# Patient Record
Sex: MALE | Race: WHITE | Employment: UNEMPLOYED | URBAN - METROPOLITAN AREA
[De-identification: names, ages, dates, MRNs, and addresses within clinical notes are randomized per-mention and may not be internally consistent; named-entity substitution may affect disease eponyms.]

---

## 2017-02-16 PROBLEM — R97.20 ELEVATED PROSTATE SPECIFIC ANTIGEN (PSA): Status: ACTIVE | Noted: 2017-02-16

## 2018-06-21 ENCOUNTER — TELEPHONE (OUTPATIENT)
Dept: UROLOGY | Age: 73
End: 2018-06-21

## 2018-06-21 ENCOUNTER — OFFICE VISIT (OUTPATIENT)
Dept: UROLOGY | Age: 73
End: 2018-06-21
Payer: COMMERCIAL

## 2018-06-21 VITALS
DIASTOLIC BLOOD PRESSURE: 75 MMHG | HEART RATE: 59 BPM | TEMPERATURE: 97.6 F | WEIGHT: 212 LBS | HEIGHT: 68 IN | BODY MASS INDEX: 32.13 KG/M2 | SYSTOLIC BLOOD PRESSURE: 118 MMHG

## 2018-06-21 DIAGNOSIS — R97.20 ELEVATED PSA: Primary | ICD-10-CM

## 2018-06-21 PROCEDURE — 99204 OFFICE O/P NEW MOD 45 MIN: CPT | Performed by: UROLOGY

## 2018-06-21 RX ORDER — ATORVASTATIN CALCIUM 80 MG/1
TABLET, FILM COATED ORAL
COMMUNITY
Start: 2018-06-16 | End: 2019-10-18 | Stop reason: SDUPTHER

## 2018-06-21 RX ORDER — TICAGRELOR 90 MG/1
TABLET ORAL
COMMUNITY
Start: 2018-06-16 | End: 2019-05-01 | Stop reason: SDUPTHER

## 2018-06-21 ASSESSMENT — ENCOUNTER SYMPTOMS
SHORTNESS OF BREATH: 0
WHEEZING: 0
COLOR CHANGE: 0
ABDOMINAL PAIN: 0
BACK PAIN: 0
EYE PAIN: 0
EYE REDNESS: 0
VOMITING: 0
NAUSEA: 0
COUGH: 0

## 2018-06-21 NOTE — TELEPHONE ENCOUNTER
Dr Leslee Santillan office called. Patient just had a stent put in about 1 month ago and cannot go off blood thinners for prostate biopsy.   Please advise

## 2018-06-26 ENCOUNTER — TELEPHONE (OUTPATIENT)
Dept: UROLOGY | Age: 73
End: 2018-06-26

## 2018-06-26 RX ORDER — CIPROFLOXACIN 500 MG/1
500 TABLET, FILM COATED ORAL 2 TIMES DAILY
Qty: 6 TABLET | Refills: 0 | Status: CANCELLED | OUTPATIENT
Start: 2018-07-26 | End: 2018-07-29

## 2018-06-27 NOTE — TELEPHONE ENCOUNTER
Patient left message on surgery scheduler line.   He would like to delay prostate biopsy until January

## 2018-07-03 NOTE — TELEPHONE ENCOUNTER
Cancelled procedure as patient requested.  Left message for patient to call and reschedule and recall entered

## 2018-11-15 ENCOUNTER — TELEPHONE (OUTPATIENT)
Dept: UROLOGY | Age: 73
End: 2018-11-15

## 2019-04-10 ENCOUNTER — OFFICE VISIT (OUTPATIENT)
Dept: PRIMARY CARE CLINIC | Age: 74
End: 2019-04-10
Payer: COMMERCIAL

## 2019-04-10 VITALS
WEIGHT: 201.6 LBS | HEART RATE: 58 BPM | DIASTOLIC BLOOD PRESSURE: 80 MMHG | OXYGEN SATURATION: 98 % | SYSTOLIC BLOOD PRESSURE: 134 MMHG | BODY MASS INDEX: 30.65 KG/M2

## 2019-04-10 DIAGNOSIS — Z12.5 ENCOUNTER FOR SCREENING FOR MALIGNANT NEOPLASM OF PROSTATE: ICD-10-CM

## 2019-04-10 DIAGNOSIS — E11.9 TYPE 2 DIABETES MELLITUS WITHOUT COMPLICATION, WITHOUT LONG-TERM CURRENT USE OF INSULIN (HCC): Primary | ICD-10-CM

## 2019-04-10 DIAGNOSIS — I25.10 CORONARY ARTERY DISEASE INVOLVING NATIVE HEART WITHOUT ANGINA PECTORIS, UNSPECIFIED VESSEL OR LESION TYPE: ICD-10-CM

## 2019-04-10 DIAGNOSIS — R97.20 ELEVATED PROSTATE SPECIFIC ANTIGEN (PSA): ICD-10-CM

## 2019-04-10 DIAGNOSIS — R97.20 ELEVATED PSA MEASUREMENT: ICD-10-CM

## 2019-04-10 LAB — HBA1C MFR BLD: 9.7 %

## 2019-04-10 PROCEDURE — 99214 OFFICE O/P EST MOD 30 MIN: CPT | Performed by: FAMILY MEDICINE

## 2019-04-10 PROCEDURE — 83036 HEMOGLOBIN GLYCOSYLATED A1C: CPT | Performed by: FAMILY MEDICINE

## 2019-04-10 ASSESSMENT — PATIENT HEALTH QUESTIONNAIRE - PHQ9
SUM OF ALL RESPONSES TO PHQ QUESTIONS 1-9: 0
SUM OF ALL RESPONSES TO PHQ QUESTIONS 1-9: 0
1. LITTLE INTEREST OR PLEASURE IN DOING THINGS: 0
2. FEELING DOWN, DEPRESSED OR HOPELESS: 0
SUM OF ALL RESPONSES TO PHQ9 QUESTIONS 1 & 2: 0

## 2019-04-10 ASSESSMENT — ENCOUNTER SYMPTOMS
SHORTNESS OF BREATH: 0
COUGH: 0

## 2019-04-10 NOTE — PROGRESS NOTES
[address] [facility] PRIMARY CARE  [id number] [facility]  Dept: [address] [facility] Tyrone is a 68 y.o. male who presents today for his medical conditions/complaintsas noted below. Marlon Haines is c/o of   Chief Complaint   Patient presents with    Diabetes     6 month follow up. Pt states his sugars are high, usually over 200.  Other     Pt would like a lab order to check his PSA       HPI:     HPI  NOT CHECKING SUGARS VERY OFTEN, BUT IS ALWAYS OVER 200. CAN'T GET IT TO COME DOWN. Pt never had prostate biopsy done- wanted to see if level went back up again. Hemoglobin A1C (%)   Date Value   09/20/2018 7.6   06/07/2018 7.5   11/20/2017 6.9             ( goal A1C is < 7)   No results found for: LABMICR  LDL Calculated (mg/dL)   Date Value   02/06/2017 54       (goal LDL is <100)   No results found for: AST, ALT, BUN  BP Readings from Last 3 Encounters:   04/10/19 134/80   09/20/18 132/76   06/21/18 118/75          (goal 140/90)    Past Medical History:   Diagnosis Date    Hyperglycemia     Neoplasm of uncertain behavior of skin     Rectal/anal hemorrhage         Social History     Tobacco Use    Smoking status: Never Smoker    Smokeless tobacco: Never Used   Substance Use Topics    Alcohol use:  Yes     Alcohol/week: 0.0 oz     Comment: occ      Current Outpatient Medications   Medication Sig Dispense Refill    metFORMIN (GLUCOPHAGE) 1000 MG tablet TAKE 1 TABLET BY MOUTH TWO TIMES A DAY WITH MEALS 60 tablet 3    losartan (COZAAR) 50 MG tablet TAKE 1 TABLET BY MOUTH ONE TIME A DAY  30 tablet 2    glipiZIDE (GLUCOTROL) 5 MG tablet TAKE 1 TABLET BY MOUTH TWO TIMES A DAY BEFORE MEALS  60 tablet 2    valsartan (DIOVAN) 80 MG tablet Take 1 tablet by mouth daily 30 tablet 3    atorvastatin (LIPITOR) 80 MG tablet       BRILINTA 90 MG TABS tablet       simvastatin (ZOCOR) 40 MG tablet TAKE 1 TABLET BY MOUTH ONE TIME A DAY  30 tablet 2    metoprolol tartrate (LOPRESSOR) 25 MG tablet Take 1 tablet by mouth 2 times daily 60 tablet 3    nitroGLYCERIN (NITROSTAT) 0.4 MG SL tablet Place 0.4 mg under the tongue every 5 minutes as needed for Chest pain Dissolve 1 tab under tongue at first sign of chest pain. May repeat every 5 minutes until relief is obtained. If pain persists after taking 3 tabs in a 15-minute period, or the pain is different than is typically experienced, call 9-1-1 immediately.  glucose blood VI test strips (ASCENSIA AUTODISC VI;ONE TOUCH ULTRA TEST VI) strip 1 each by In Vitro route daily Indications: Type 2 Diabetes Test once daily 100 each 2    aspirin 81 MG tablet Take 81 mg by mouth daily Indications: Disease of the Arteries of the Heart       No current facility-administered medications for this visit. Allergies   Allergen Reactions    Lisinopril      Other reaction(s): Intolerance-unknown       Health Maintenance   Topic Date Due    Diabetic retinal exam  04/15/1955    Shingles Vaccine (1 of 2) 04/15/1995    Pneumococcal 65+ years Vaccine (2 of 2 - PPSV23) 11/10/2016    Diabetic foot exam  01/19/2018    Lipid screen  02/06/2018    Potassium monitoring  02/06/2018    Creatinine monitoring  02/06/2018    DTaP/Tdap/Td vaccine (2 - Td) 08/28/2018    A1C test (Diabetic or Prediabetic)  09/20/2019    Diabetic microalbuminuria test  09/20/2019    Colon cancer screen colonoscopy  03/10/2024    Flu vaccine  Completed    Hepatitis C screen  Completed       Subjective:     Review of Systems   Constitutional: Negative for chills and fever. Respiratory: Negative for cough and shortness of breath. Cardiovascular: Negative for chest pain and palpitations. Neurological: Negative for dizziness and light-headedness. Objective:     /80   Pulse 58   Wt 201 lb 9.6 oz (91.4 kg)   SpO2 98%   BMI 30.65 kg/m²   Physical Exam   Constitutional: He is oriented to person, place, and time.  He appears well-developed and well-nourished. No distress. HENT:   Head: Normocephalic and atraumatic. Eyes: Pupils are equal, round, and reactive to light. Conjunctivae are normal. Right eye exhibits no discharge. Left eye exhibits no discharge. No scleral icterus. Neck: No tracheal deviation present. No thyromegaly present. Cardiovascular: Normal rate, regular rhythm and normal heart sounds. No carotid bruits   Pulmonary/Chest: Effort normal and breath sounds normal. No respiratory distress. He has no wheezes. Musculoskeletal: He exhibits no edema. Lymphadenopathy:     He has no cervical adenopathy. Neurological: He is alert and oriented to person, place, and time. Skin: Skin is warm. No rash noted. Psychiatric: He has a normal mood and affect. His behavior is normal. Thought content normal.   Nursing note and vitals reviewed. Assessment:       Diagnosis Orders   1. Type 2 diabetes mellitus without complication, without long-term current use of insulin (HCC)  Insulin, Free    Insulin, Total    Psa screening   2. Elevated PSA measurement  Insulin, Free    Insulin, Total    Psa screening   3. Encounter for screening for malignant neoplasm of prostate   Psa screening   4. Coronary artery disease involving native heart without angina pectoris, unspecified vessel or lesion type     5. Elevated prostate specific antigen (PSA)          Plan:    d/w pt that if he has cancer would he want treatment or not. Return in about 3 months (around 7/10/2019) for DM check, HTN f/u. Orders Placed This Encounter   Procedures    Insulin, Free     Standing Status:   Future     Standing Expiration Date:   4/10/2020    Insulin, Total     Standing Status:   Future     Standing Expiration Date:   4/10/2020    Psa screening     Standing Status:   Future     Standing Expiration Date:   4/10/2020     No orders of the defined types were placed in this encounter.       Patient given educationalmaterials - see patient instructions. Discussed use, benefit, and side effectsof prescribed medications. All patient questions answered. Pt voiced understanding. Reviewed health maintenance. Instructed to continue current medications, diet andexercise. Patient agreed with treatment plan. Follow up as directed.      Electronicallysigned by Horacio Noonan MD on 4/10/2019 at 2:58 PM

## 2019-05-01 RX ORDER — TICAGRELOR 90 MG/1
90 TABLET ORAL 2 TIMES DAILY
Qty: 60 TABLET | Refills: 2 | Status: SHIPPED | OUTPATIENT
Start: 2019-05-01 | End: 2019-07-07 | Stop reason: SDUPTHER

## 2019-05-01 NOTE — TELEPHONE ENCOUNTER
Last visit: 4/10/2019   Last Med refill:   Patient been get these medication from another Dr . When he was inpatient about one year ago, per the [facility]app)  He been out for 3 days ,     Next Visit Date:  Future Appointments   Date Time Provider Justice Claudine   5/10/2019  1:30 PM MD [id number]   7/15/2019  9:15 AM MD [facility] [address] Maintenance   Topic Date Due    Diabetic retinal exam  04/15/1955    Shingles Vaccine (1 of 2) 04/15/1995    Pneumococcal 65+ years Vaccine (2 of 2 - PPSV23) 11/10/2016    Diabetic foot exam  01/19/2018    Lipid screen  02/06/2018    Potassium monitoring  02/06/2018    Creatinine monitoring  02/06/2018    DTaP/Tdap/Td vaccine (2 - Td) 08/28/2018    A1C test (Diabetic or Prediabetic)  07/10/2019    Diabetic microalbuminuria test  09/20/2019    Colon cancer screen colonoscopy  03/10/2024    Flu vaccine  Completed    Hepatitis C screen  Completed       Hemoglobin A1C (%)   Date Value   04/10/2019 9.7   09/20/2018 7.6   06/07/2018 7.5             ( goal A1C is < 7)   No results found for: LABMICR  LDL Calculated (mg/dL)   Date Value   02/06/2017 54       (goal LDL is <100)   No results found for: AST, ALT, BUN  BP Readings from Last 3 Encounters:   04/10/19 134/80   09/20/18 132/76   06/21/18 118/75          (goal 120/80)    All Future Testing planned in [facility]  Lab Frequency Next Occurrence               Patient Active Problem List:     ALT (SGPT) level raised     Chest pain     Coronary artery disease     Deep vein thrombosis of lower extremity (HCC)     Hyperlipidemia     Hypertension     Inflamed seborrheic keratosis     Lentigo     Male erectile disorder     Microalbuminuria     Mid back pain     Motion sickness     Osteoarthritis     Seborrheic keratosis     Sweating     Type 2 diabetes mellitus (HCC)     Elevated prostate specific antigen (PSA)

## 2019-05-10 ENCOUNTER — OFFICE VISIT (OUTPATIENT)
Dept: PRIMARY CARE CLINIC | Age: 74
End: 2019-05-10
Payer: COMMERCIAL

## 2019-05-10 VITALS
HEART RATE: 76 BPM | WEIGHT: 200.8 LBS | BODY MASS INDEX: 30.53 KG/M2 | OXYGEN SATURATION: 96 % | DIASTOLIC BLOOD PRESSURE: 80 MMHG | SYSTOLIC BLOOD PRESSURE: 138 MMHG

## 2019-05-10 DIAGNOSIS — R97.20 ELEVATED PROSTATE SPECIFIC ANTIGEN (PSA): ICD-10-CM

## 2019-05-10 DIAGNOSIS — I25.10 CORONARY ARTERY DISEASE INVOLVING NATIVE HEART WITHOUT ANGINA PECTORIS, UNSPECIFIED VESSEL OR LESION TYPE: ICD-10-CM

## 2019-05-10 DIAGNOSIS — R97.20 ELEVATED PSA MEASUREMENT: Primary | ICD-10-CM

## 2019-05-10 DIAGNOSIS — E11.59 TYPE 2 DIABETES MELLITUS WITH OTHER CIRCULATORY COMPLICATION, UNSPECIFIED WHETHER LONG TERM INSULIN USE (HCC): ICD-10-CM

## 2019-05-10 DIAGNOSIS — I10 ESSENTIAL HYPERTENSION: ICD-10-CM

## 2019-05-10 PROCEDURE — 99213 OFFICE O/P EST LOW 20 MIN: CPT | Performed by: FAMILY MEDICINE

## 2019-05-10 ASSESSMENT — ENCOUNTER SYMPTOMS
NAUSEA: 0
EYE DISCHARGE: 0
WHEEZING: 0
SORE THROAT: 0
RHINORRHEA: 0
VOMITING: 0
EYE REDNESS: 0
COUGH: 0
SHORTNESS OF BREATH: 0
ABDOMINAL PAIN: 0
DIARRHEA: 0

## 2019-05-10 NOTE — PROGRESS NOTES
minutes as needed for Chest pain Dissolve 1 tab under tongue at first sign of chest pain. May repeat every 5 minutes until relief is obtained. If pain persists after taking 3 tabs in a 15-minute period, or the pain is different than is typically experienced, call 9-1-1 immediately.  glucose blood VI test strips (ASCENSIA AUTODISC VI;ONE TOUCH ULTRA TEST VI) strip 1 each by In Vitro route daily Indications: Type 2 Diabetes Test once daily 100 each 2    aspirin 81 MG tablet Take 81 mg by mouth daily Indications: Disease of the Arteries of the Heart       No current facility-administered medications for this visit. Allergies   Allergen Reactions    Lisinopril      Other reaction(s): Intolerance-unknown       Health Maintenance   Topic Date Due    Diabetic retinal exam  [birth year]15/1955    Shingles Vaccine (1 of 2) 04/15/1995    Pneumococcal 65+ years Vaccine (2 of 2 - PPSV23) 11/10/2016    Diabetic foot exam  01/19/2018    Lipid screen  02/06/2018    DTaP/Tdap/Td vaccine (2 - Td) 08/28/2018    A1C test (Diabetic or Prediabetic)  07/10/2019    Diabetic microalbuminuria test  09/20/2019    Colon cancer screen colonoscopy  03/10/2024    Flu vaccine  Completed    Hepatitis C screen  Completed       Subjective:      Review of Systems   Constitutional: Negative for chills and fever. HENT: Negative for rhinorrhea and sore throat. Eyes: Negative for discharge and redness. Respiratory: Negative for cough, shortness of breath and wheezing. Cardiovascular: Negative for chest pain and palpitations. Gastrointestinal: Negative for abdominal pain, diarrhea, nausea and vomiting. Genitourinary: Negative for dysuria and frequency. Musculoskeletal: Negative for arthralgias and myalgias. Neurological: Negative for dizziness, light-headedness and headaches. Psychiatric/Behavioral: Negative for sleep disturbance.        Objective:     /80   Pulse 76   Wt 200 lb 12.8 oz (91.1 kg)   SpO2 96%   BMI 30.53 kg/m²   Physical Exam   Constitutional: He is oriented to person, place, and time. He appears well-developed and well-nourished. No distress. HENT:   Head: Normocephalic and atraumatic. Mouth/Throat: Oropharynx is clear and moist.   Eyes: Pupils are equal, round, and reactive to light. Conjunctivae are normal. Right eye exhibits no discharge. Left eye exhibits no discharge. No scleral icterus. Neck: No tracheal deviation present. No thyromegaly present. Cardiovascular: Normal rate, regular rhythm and normal heart sounds. No carotid bruits   Pulmonary/Chest: Effort normal and breath sounds normal. No respiratory distress. He has no wheezes. Musculoskeletal: He exhibits no edema. Lymphadenopathy:     He has no cervical adenopathy. Neurological: He is alert and oriented to person, place, and time. Skin: Skin is warm. No rash noted. Psychiatric: He has a normal mood and affect. His behavior is normal. Thought content normal.   Nursing note and vitals reviewed. Assessment:       Diagnosis Orders   1. Elevated PSA measurement  Lucy Olea MD, Urology, [place]   2. Coronary artery disease involving native heart without angina pectoris, unspecified vessel or lesion type     3. Elevated prostate specific antigen (PSA)     4. Type 2 diabetes mellitus with other circulatory complication, unspecified whether long term insulin use ([facility] [address]     5. Essential hypertension          Plan:      No follow-ups on file. Orders Placed This Encounter   Procedures   [facility]     Referral Priority:   Routine     Referral Type:   Eval and Treat     Referral Reason:   Specialty Services Required     Referred to Provider:   Monica Anders MD     Requested Specialty:   Urology     Number of Visits Requested:   1     No orders of the defined types were placed in this encounter. Patient given educationalmaterials - see patient instructions.   Discussed use, benefit, and side effectsof prescribed medications. All patient questions answered. Pt voiced understanding. Reviewed health maintenance. Instructed to continue current medications, diet andexercise. Patient agreed with treatment plan. Follow up as directed.      Electronicallysigned by Keshav Rubalcava MD on 5/10/2019 at 1:48 PM

## 2019-06-25 ENCOUNTER — TELEPHONE (OUTPATIENT)
Dept: PRIMARY CARE CLINIC | Age: 74
End: 2019-06-25

## 2019-06-25 NOTE — TELEPHONE ENCOUNTER
Left message sking pt to call office back to reschedule appt since Dr Marvin Smith evette not be here 7/15/2019

## 2019-07-08 RX ORDER — TICAGRELOR 90 MG/1
TABLET ORAL
Qty: 60 TABLET | Refills: 1 | Status: SHIPPED | OUTPATIENT
Start: 2019-07-08 | End: 2019-09-07 | Stop reason: SDUPTHER

## 2019-07-25 ENCOUNTER — TELEPHONE (OUTPATIENT)
Dept: PEDIATRIC GASTROENTEROLOGY | Age: 74
End: 2019-07-25

## 2019-07-30 ENCOUNTER — TELEPHONE (OUTPATIENT)
Dept: PRIMARY CARE CLINIC | Age: 74
End: 2019-07-30

## 2019-07-30 RX ORDER — GLIPIZIDE 5 MG/1
5 TABLET ORAL
Qty: 60 TABLET | Refills: 3 | Status: SHIPPED | OUTPATIENT
Start: 2019-07-30 | End: 2019-12-18

## 2019-08-15 ENCOUNTER — OFFICE VISIT (OUTPATIENT)
Dept: PRIMARY CARE CLINIC | Age: 74
End: 2019-08-15
Payer: COMMERCIAL

## 2019-08-15 VITALS
DIASTOLIC BLOOD PRESSURE: 74 MMHG | BODY MASS INDEX: 30.77 KG/M2 | HEART RATE: 70 BPM | SYSTOLIC BLOOD PRESSURE: 128 MMHG | WEIGHT: 202.4 LBS | OXYGEN SATURATION: 98 %

## 2019-08-15 DIAGNOSIS — E78.00 PURE HYPERCHOLESTEROLEMIA: ICD-10-CM

## 2019-08-15 DIAGNOSIS — E11.59 TYPE 2 DIABETES MELLITUS WITH OTHER CIRCULATORY COMPLICATION, UNSPECIFIED WHETHER LONG TERM INSULIN USE (HCC): ICD-10-CM

## 2019-08-15 DIAGNOSIS — I10 ESSENTIAL HYPERTENSION: Primary | ICD-10-CM

## 2019-08-15 LAB — HBA1C MFR BLD: 8 %

## 2019-08-15 PROCEDURE — 83036 HEMOGLOBIN GLYCOSYLATED A1C: CPT | Performed by: FAMILY MEDICINE

## 2019-08-15 PROCEDURE — G0009 ADMIN PNEUMOCOCCAL VACCINE: HCPCS | Performed by: FAMILY MEDICINE

## 2019-08-15 PROCEDURE — 90732 PPSV23 VACC 2 YRS+ SUBQ/IM: CPT | Performed by: FAMILY MEDICINE

## 2019-08-15 PROCEDURE — 99214 OFFICE O/P EST MOD 30 MIN: CPT | Performed by: FAMILY MEDICINE

## 2019-08-15 ASSESSMENT — ENCOUNTER SYMPTOMS
SHORTNESS OF BREATH: 0
SORE THROAT: 0
EYE DISCHARGE: 0
NAUSEA: 0
VOMITING: 0
ABDOMINAL PAIN: 0
WHEEZING: 0
RHINORRHEA: 0
DIARRHEA: 0
EYE REDNESS: 0
COUGH: 0

## 2019-09-09 RX ORDER — TICAGRELOR 90 MG/1
TABLET ORAL
Qty: 60 TABLET | Refills: 3 | Status: SHIPPED | OUTPATIENT
Start: 2019-09-09 | End: 2020-02-24 | Stop reason: SDUPTHER

## 2019-09-12 LAB
ALBUMIN SERPL-MCNC: NORMAL G/DL
ALP BLD-CCNC: NORMAL U/L
ALT SERPL-CCNC: NORMAL U/L
ANION GAP SERPL CALCULATED.3IONS-SCNC: NORMAL MMOL/L
AST SERPL-CCNC: NORMAL U/L
BASOPHILS ABSOLUTE: NORMAL /ΜL
BASOPHILS RELATIVE PERCENT: NORMAL %
BILIRUB SERPL-MCNC: NORMAL MG/DL (ref 0.1–1.4)
BUN BLDV-MCNC: NORMAL MG/DL
CALCIUM SERPL-MCNC: NORMAL MG/DL
CHLORIDE BLD-SCNC: NORMAL MMOL/L
CHOLESTEROL, TOTAL: 105 MG/DL
CHOLESTEROL/HDL RATIO: 3.5
CO2: NORMAL MMOL/L
CREAT SERPL-MCNC: NORMAL MG/DL
EOSINOPHILS ABSOLUTE: NORMAL /ΜL
EOSINOPHILS RELATIVE PERCENT: NORMAL %
GFR CALCULATED: NORMAL
GLUCOSE BLD-MCNC: 157 MG/DL
HCT VFR BLD CALC: NORMAL % (ref 41–53)
HDLC SERPL-MCNC: 30 MG/DL (ref 35–70)
HEMOGLOBIN: NORMAL G/DL (ref 13.5–17.5)
LDL CHOLESTEROL CALCULATED: 47 MG/DL (ref 0–160)
LYMPHOCYTES ABSOLUTE: NORMAL /ΜL
LYMPHOCYTES RELATIVE PERCENT: NORMAL %
MCH RBC QN AUTO: NORMAL PG
MCHC RBC AUTO-ENTMCNC: NORMAL G/DL
MCV RBC AUTO: NORMAL FL
MONOCYTES ABSOLUTE: NORMAL /ΜL
MONOCYTES RELATIVE PERCENT: NORMAL %
NEUTROPHILS ABSOLUTE: NORMAL /ΜL
NEUTROPHILS RELATIVE PERCENT: NORMAL %
PDW BLD-RTO: NORMAL %
PLATELET # BLD: NORMAL K/ΜL
PMV BLD AUTO: NORMAL FL
POTASSIUM SERPL-SCNC: NORMAL MMOL/L
RBC # BLD: NORMAL 10^6/ΜL
SODIUM BLD-SCNC: NORMAL MMOL/L
TOTAL PROTEIN: NORMAL
TRIGL SERPL-MCNC: 140 MG/DL
VLDLC SERPL CALC-MCNC: 28 MG/DL
WBC # BLD: NORMAL 10^3/ML

## 2019-09-16 DIAGNOSIS — E11.59 TYPE 2 DIABETES MELLITUS WITH OTHER CIRCULATORY COMPLICATION, UNSPECIFIED WHETHER LONG TERM INSULIN USE (HCC): ICD-10-CM

## 2019-09-16 DIAGNOSIS — E78.00 PURE HYPERCHOLESTEROLEMIA: ICD-10-CM

## 2019-10-18 ENCOUNTER — OFFICE VISIT (OUTPATIENT)
Dept: PRIMARY CARE CLINIC | Age: 74
End: 2019-10-18
Payer: COMMERCIAL

## 2019-10-18 VITALS
HEART RATE: 60 BPM | WEIGHT: 200.4 LBS | SYSTOLIC BLOOD PRESSURE: 114 MMHG | DIASTOLIC BLOOD PRESSURE: 78 MMHG | BODY MASS INDEX: 30.37 KG/M2 | OXYGEN SATURATION: 97 % | HEIGHT: 68 IN

## 2019-10-18 DIAGNOSIS — M16.0 PRIMARY OSTEOARTHRITIS OF BOTH HIPS: ICD-10-CM

## 2019-10-18 DIAGNOSIS — Z00.00 MEDICARE ANNUAL WELLNESS VISIT, SUBSEQUENT: Primary | ICD-10-CM

## 2019-10-18 DIAGNOSIS — Z00.00 ROUTINE GENERAL MEDICAL EXAMINATION AT A HEALTH CARE FACILITY: ICD-10-CM

## 2019-10-18 PROCEDURE — G0438 PPPS, INITIAL VISIT: HCPCS | Performed by: FAMILY MEDICINE

## 2019-10-18 PROCEDURE — 90653 IIV ADJUVANT VACCINE IM: CPT | Performed by: FAMILY MEDICINE

## 2019-10-18 PROCEDURE — G0008 ADMIN INFLUENZA VIRUS VAC: HCPCS | Performed by: FAMILY MEDICINE

## 2019-10-18 RX ORDER — ATORVASTATIN CALCIUM 80 MG/1
80 TABLET, FILM COATED ORAL DAILY
Qty: 30 TABLET | Refills: 5 | Status: SHIPPED | OUTPATIENT
Start: 2019-10-18 | End: 2021-01-01

## 2019-10-18 SDOH — HEALTH STABILITY: MENTAL HEALTH: HOW OFTEN DO YOU HAVE A DRINK CONTAINING ALCOHOL?: MONTHLY OR LESS

## 2019-10-18 ASSESSMENT — PATIENT HEALTH QUESTIONNAIRE - PHQ9
2. FEELING DOWN, DEPRESSED OR HOPELESS: 0
SUM OF ALL RESPONSES TO PHQ QUESTIONS 1-9: 0
1. LITTLE INTEREST OR PLEASURE IN DOING THINGS: 0
SUM OF ALL RESPONSES TO PHQ QUESTIONS 1-9: 0
SUM OF ALL RESPONSES TO PHQ9 QUESTIONS 1 & 2: 0

## 2019-10-30 ENCOUNTER — TELEPHONE (OUTPATIENT)
Dept: PRIMARY CARE CLINIC | Age: 74
End: 2019-10-30

## 2019-11-15 ENCOUNTER — TELEPHONE (OUTPATIENT)
Dept: PRIMARY CARE CLINIC | Age: 74
End: 2019-11-15

## 2019-11-18 ENCOUNTER — OFFICE VISIT (OUTPATIENT)
Dept: PRIMARY CARE CLINIC | Age: 74
End: 2019-11-18
Payer: COMMERCIAL

## 2019-11-18 VITALS
WEIGHT: 203.8 LBS | HEIGHT: 68 IN | HEART RATE: 82 BPM | OXYGEN SATURATION: 97 % | SYSTOLIC BLOOD PRESSURE: 160 MMHG | DIASTOLIC BLOOD PRESSURE: 78 MMHG | BODY MASS INDEX: 30.89 KG/M2

## 2019-11-18 DIAGNOSIS — I10 ESSENTIAL HYPERTENSION: ICD-10-CM

## 2019-11-18 DIAGNOSIS — E11.59 TYPE 2 DIABETES MELLITUS WITH OTHER CIRCULATORY COMPLICATION, UNSPECIFIED WHETHER LONG TERM INSULIN USE (HCC): Primary | ICD-10-CM

## 2019-11-18 DIAGNOSIS — I25.10 CORONARY ARTERY DISEASE INVOLVING NATIVE HEART WITHOUT ANGINA PECTORIS, UNSPECIFIED VESSEL OR LESION TYPE: ICD-10-CM

## 2019-11-18 LAB
CREATININE URINE POCT: ABNORMAL
HBA1C MFR BLD: 6.9 %
MICROALBUMIN/CREAT 24H UR: ABNORMAL MG/G{CREAT}
MICROALBUMIN/CREAT UR-RTO: ABNORMAL

## 2019-11-18 PROCEDURE — 99214 OFFICE O/P EST MOD 30 MIN: CPT | Performed by: FAMILY MEDICINE

## 2019-11-18 PROCEDURE — 83036 HEMOGLOBIN GLYCOSYLATED A1C: CPT | Performed by: FAMILY MEDICINE

## 2019-11-18 PROCEDURE — 82044 UR ALBUMIN SEMIQUANTITATIVE: CPT | Performed by: FAMILY MEDICINE

## 2019-11-18 ASSESSMENT — ENCOUNTER SYMPTOMS
DIARRHEA: 0
SINUS PRESSURE: 0
CHEST TIGHTNESS: 0
ABDOMINAL PAIN: 0
SHORTNESS OF BREATH: 0
NAUSEA: 0
SINUS PAIN: 0
VOMITING: 0
SORE THROAT: 0
RHINORRHEA: 1
CONSTIPATION: 0
COUGH: 0

## 2019-12-18 RX ORDER — GLIPIZIDE 5 MG/1
TABLET ORAL
Qty: 60 TABLET | Refills: 2 | Status: SHIPPED | OUTPATIENT
Start: 2019-12-18 | End: 2020-02-24 | Stop reason: SDUPTHER

## 2020-01-01 ENCOUNTER — TELEPHONE (OUTPATIENT)
Dept: PRIMARY CARE CLINIC | Age: 75
End: 2020-01-01

## 2020-01-01 ENCOUNTER — NURSE ONLY (OUTPATIENT)
Dept: PRIMARY CARE CLINIC | Age: 75
End: 2020-01-01

## 2020-01-01 LAB
BILIRUBIN, POC: ABNORMAL
BLOOD URINE, POC: ABNORMAL
CLARITY, POC: ABNORMAL
COLOR, POC: ABNORMAL
GLUCOSE URINE, POC: ABNORMAL
KETONES, POC: ABNORMAL
LEUKOCYTE EST, POC: ABNORMAL
NITRITE, POC: POSITIVE
PH, POC: 5.5
PROTEIN, POC: ABNORMAL
SPECIFIC GRAVITY, POC: 1.02
UROBILINOGEN, POC: ABNORMAL

## 2020-01-01 RX ORDER — PHENAZOPYRIDINE HYDROCHLORIDE 200 MG/1
200 TABLET, FILM COATED ORAL 3 TIMES DAILY
Qty: 6 TABLET | Refills: 0 | Status: SHIPPED | OUTPATIENT
Start: 2020-01-01 | End: 2020-01-01

## 2020-01-01 RX ORDER — CIPROFLOXACIN 500 MG/1
500 TABLET, FILM COATED ORAL 2 TIMES DAILY
Qty: 14 TABLET | Refills: 0 | Status: SHIPPED | OUTPATIENT
Start: 2020-01-01 | End: 2020-01-01

## 2020-01-01 RX ORDER — CIPROFLOXACIN 500 MG/1
500 TABLET, FILM COATED ORAL 2 TIMES DAILY
Qty: 20 TABLET | Refills: 0 | Status: SHIPPED | OUTPATIENT
Start: 2020-01-01 | End: 2020-01-01

## 2020-02-19 ENCOUNTER — OFFICE VISIT (OUTPATIENT)
Dept: PRIMARY CARE CLINIC | Age: 75
End: 2020-02-19
Payer: COMMERCIAL

## 2020-02-19 VITALS
WEIGHT: 207 LBS | OXYGEN SATURATION: 98 % | HEART RATE: 87 BPM | BODY MASS INDEX: 31.47 KG/M2 | SYSTOLIC BLOOD PRESSURE: 142 MMHG | DIASTOLIC BLOOD PRESSURE: 84 MMHG

## 2020-02-19 LAB — HBA1C MFR BLD: 6.9 %

## 2020-02-19 PROCEDURE — 99213 OFFICE O/P EST LOW 20 MIN: CPT | Performed by: FAMILY MEDICINE

## 2020-02-19 PROCEDURE — 83036 HEMOGLOBIN GLYCOSYLATED A1C: CPT | Performed by: FAMILY MEDICINE

## 2020-02-19 ASSESSMENT — PATIENT HEALTH QUESTIONNAIRE - PHQ9
SUM OF ALL RESPONSES TO PHQ9 QUESTIONS 1 & 2: 0
1. LITTLE INTEREST OR PLEASURE IN DOING THINGS: 0
SUM OF ALL RESPONSES TO PHQ QUESTIONS 1-9: 0
2. FEELING DOWN, DEPRESSED OR HOPELESS: 0
SUM OF ALL RESPONSES TO PHQ QUESTIONS 1-9: 0

## 2020-02-19 ASSESSMENT — ENCOUNTER SYMPTOMS
ABDOMINAL PAIN: 0
DIARRHEA: 0
SORE THROAT: 0
EYE REDNESS: 0
WHEEZING: 0
RHINORRHEA: 0
NAUSEA: 0
VOMITING: 0
COUGH: 1
EYE DISCHARGE: 0
SHORTNESS OF BREATH: 0

## 2020-02-19 NOTE — PROGRESS NOTES
[address] [facility] PRIMARY CARE  [address] [facility] [address]  Dept: [facility] Krishan Hackett is a 76 y.o. male who presents today for his medical conditions/complaintsas noted below. Samson Palma is c/o of   Chief Complaint   Patient presents with    Diabetes     Patient checks bs at home    Hypertension     Patient doesn't check bp at home    Results     MRI in care everywhere       HPI:     HPI   pt has cold/ cough taking Mucinex DM liquid and that is helping. Trying to rest.  Had MRI prostate done- reviewed results with pt- will call to get appt with Dr. Jonathan Osorio sugars 2-3 times a week- running 100's most of the time. Not changing his diet too much more  Has appt with cardiology- having CP occas- vin when out in the cold. Hemoglobin A1C (%)   Date Value   02/19/2020 6.9   11/18/2019 6.9   08/15/2019 8.0             ( goal A1C is < 7)   No results found for: LABMICR  LDL Calculated (mg/dL)   Date Value   09/12/2019 47   02/06/2017 54       (goal LDL is <100)   No results found for: AST, ALT, BUN  BP Readings from Last 3 Encounters:   02/19/20 (!) 142/84   11/18/19 (!) 160/78   10/18/19 114/78          (goal 140/90)    Past Medical History:   Diagnosis Date    Hyperglycemia     Neoplasm of uncertain behavior of skin     Rectal/anal hemorrhage         Social History     Tobacco Use    Smoking status: Never Smoker    Smokeless tobacco: Never Used   Substance Use Topics    Alcohol use:  Yes     Alcohol/week: 0.0 standard drinks     Frequency: Monthly or less     Comment: occ      Current Outpatient Medications   Medication Sig Dispense Refill    metFORMIN (GLUCOPHAGE) 1000 MG tablet TAKE 1 TABLET BY MOUTH TWO TIMES A DAY WITH MEALS 60 tablet 3    glipiZIDE (GLUCOTROL) 5 MG tablet TAKE 1 TABLET BY MOUTH TWO TIMES A DAY BEFORE MEALS 60 tablet 2    atorvastatin (LIPITOR) 80 MG tablet Take 1 tablet by mouth daily 30 tablet 5    BRILINTA 90 MG TABS tablet TAKE 1 TABLET BY MOUTH TWO TIMES A DAY  60 tablet 3    metoprolol tartrate (LOPRESSOR) 25 MG tablet Take 1 tablet by mouth 2 times daily 60 tablet 3    nitroGLYCERIN (NITROSTAT) 0.4 MG SL tablet Place 0.4 mg under the tongue every 5 minutes as needed for Chest pain Dissolve 1 tab under tongue at first sign of chest pain. May repeat every 5 minutes until relief is obtained. If pain persists after taking 3 tabs in a 15-minute period, or the pain is different than is typically experienced, call 9-1-1 immediately.  glucose blood VI test strips (ASCENSIA AUTODISC VI;ONE TOUCH ULTRA TEST VI) strip 1 each by In Vitro route daily Indications: Type 2 Diabetes Test once daily 100 each 2    aspirin 81 MG tablet Take 81 mg by mouth daily Indications: Disease of the Arteries of the Heart       No current facility-administered medications for this visit. Allergies   Allergen Reactions    Lisinopril      Other reaction(s): Intolerance-unknown       Health Maintenance   Topic Date Due    Hepatitis B vaccine (1 of 3 - Risk 3-dose series) 04/15/1964    Shingles Vaccine (1 of 2) 04/15/1995    Diabetic foot exam  01/19/2018    DTaP/Tdap/Td vaccine (2 - Td) 08/28/2018    Diabetic retinal exam  05/09/2020    Lipid screen  09/12/2020    Annual Wellness Visit (AWV)  10/18/2020    A1C test (Diabetic or Prediabetic)  11/18/2020    Diabetic microalbuminuria test  11/18/2020    Colon cancer screen colonoscopy  03/10/2024    Flu vaccine  Completed    Pneumococcal 65+ years Vaccine  Completed    Hepatitis C screen  Completed    Hepatitis A vaccine  Aged Out    Hib vaccine  Aged Out    Meningococcal (ACWY) vaccine  Aged Out       Subjective:     Review of Systems   Constitutional: Positive for diaphoresis (night sweats since he has been sick). Negative for chills and fever. HENT: Negative for rhinorrhea and sore throat. Eyes: Negative for discharge and redness.    Respiratory: Positive for cough (meds heping). Negative for shortness of breath and wheezing. Cardiovascular: Positive for chest pain (had episode when on vacation in [place]- usually brought on by cold air.  ). Negative for palpitations. Gastrointestinal: Negative for abdominal pain, diarrhea, nausea and vomiting. Genitourinary: Negative for dysuria and frequency. Musculoskeletal: Negative for arthralgias and myalgias. Neurological: Negative for dizziness, light-headedness and headaches. Psychiatric/Behavioral: Negative for sleep disturbance. Objective:     BP (!) 142/84   Pulse 87   Wt 207 lb (93.9 kg)   SpO2 98%   BMI 31.47 kg/m²   Physical Exam  Vitals signs and nursing note reviewed. Constitutional:       General: He is not in acute distress. Appearance: He is well-developed. HENT:      Head: Normocephalic and atraumatic. Eyes:      General: No scleral icterus. Right eye: No discharge. Left eye: No discharge. Conjunctiva/sclera: Conjunctivae normal.      Pupils: Pupils are equal, round, and reactive to light. Neck:      Thyroid: No thyromegaly. Trachea: No tracheal deviation. Cardiovascular:      Rate and Rhythm: Normal rate and regular rhythm. Heart sounds: Normal heart sounds. Comments: No carotid bruits  Pulmonary:      Effort: Pulmonary effort is normal. No respiratory distress. Breath sounds: Normal breath sounds. No wheezing. Lymphadenopathy:      Cervical: No cervical adenopathy. Skin:     General: Skin is warm. Findings: No rash. Neurological:      Mental Status: He is alert and oriented to person, place, and time. Psychiatric:         Behavior: Behavior normal.         Thought Content: Thought content normal.         Assessment:       Diagnosis Orders   1. Elevated prostate specific antigen (PSA)     2. Essential hypertension     3.  Type 2 diabetes mellitus with other circulatory complication, unspecified whether long term insulin use (HCC)  POCT

## 2020-02-24 RX ORDER — GLIPIZIDE 5 MG/1
5 TABLET ORAL
Qty: 180 TABLET | Refills: 2 | Status: SHIPPED | OUTPATIENT
Start: 2020-02-24 | End: 2020-01-01

## 2020-02-24 RX ORDER — TICAGRELOR 90 MG/1
90 TABLET ORAL 2 TIMES DAILY
Qty: 180 TABLET | Refills: 2 | Status: SHIPPED | OUTPATIENT
Start: 2020-02-24 | End: 2020-11-10

## 2020-05-18 ENCOUNTER — TELEPHONE (OUTPATIENT)
Dept: PRIMARY CARE CLINIC | Age: 75
End: 2020-05-18

## 2020-08-06 ENCOUNTER — OFFICE VISIT (OUTPATIENT)
Dept: PRIMARY CARE CLINIC | Age: 75
End: 2020-08-06
Payer: COMMERCIAL

## 2020-08-06 VITALS
TEMPERATURE: 97.7 F | WEIGHT: 208.2 LBS | OXYGEN SATURATION: 98 % | BODY MASS INDEX: 31.66 KG/M2 | DIASTOLIC BLOOD PRESSURE: 66 MMHG | SYSTOLIC BLOOD PRESSURE: 138 MMHG | HEART RATE: 64 BPM

## 2020-08-06 LAB — HBA1C MFR BLD: 8.8 %

## 2020-08-06 PROCEDURE — 3052F HG A1C>EQUAL 8.0%<EQUAL 9.0%: CPT | Performed by: FAMILY MEDICINE

## 2020-08-06 PROCEDURE — 99213 OFFICE O/P EST LOW 20 MIN: CPT | Performed by: FAMILY MEDICINE

## 2020-08-06 PROCEDURE — 83036 HEMOGLOBIN GLYCOSYLATED A1C: CPT | Performed by: FAMILY MEDICINE

## 2020-08-06 RX ORDER — FLASH GLUCOSE SCANNING READER
EACH MISCELLANEOUS
Qty: 1 DEVICE | Refills: 3 | Status: SHIPPED | OUTPATIENT
Start: 2020-08-06

## 2020-08-06 RX ORDER — FLASH GLUCOSE SENSOR
KIT MISCELLANEOUS
Qty: 2 EACH | Refills: 3 | Status: SHIPPED | OUTPATIENT
Start: 2020-08-06

## 2020-08-06 ASSESSMENT — PATIENT HEALTH QUESTIONNAIRE - PHQ9
SUM OF ALL RESPONSES TO PHQ QUESTIONS 1-9: 0
1. LITTLE INTEREST OR PLEASURE IN DOING THINGS: 0
SUM OF ALL RESPONSES TO PHQ QUESTIONS 1-9: 0
SUM OF ALL RESPONSES TO PHQ9 QUESTIONS 1 & 2: 0
2. FEELING DOWN, DEPRESSED OR HOPELESS: 0

## 2020-08-06 ASSESSMENT — ENCOUNTER SYMPTOMS
NAUSEA: 0
SHORTNESS OF BREATH: 0
EYE DISCHARGE: 0
EYE PAIN: 0
COUGH: 0
VOMITING: 0
CHEST TIGHTNESS: 0
SORE THROAT: 0

## 2020-08-06 NOTE — PROGRESS NOTES
DAY SENSOR) MISC Use to check sugars due to hyperglycemia and uncontrolled DM 2 each 3    Continuous Blood Gluc  (FREESTYLE AMINAH 14 DAY READER) LAURA Use to check BS qid prn due to hyperglycemia 1 Device 3    metFORMIN (GLUCOPHAGE) 1000 MG tablet TAKE 1 TABLET BY MOUTH TWO TIMES A DAY WITH MEALS 60 tablet 3    glipiZIDE (GLUCOTROL) 5 MG tablet Take 1 tablet by mouth 2 times daily (before meals) 180 tablet 2    atorvastatin (LIPITOR) 80 MG tablet Take 1 tablet by mouth daily 30 tablet 5    metoprolol tartrate (LOPRESSOR) 25 MG tablet Take 1 tablet by mouth 2 times daily 60 tablet 3    nitroGLYCERIN (NITROSTAT) 0.4 MG SL tablet Place 0.4 mg under the tongue every 5 minutes as needed for Chest pain Dissolve 1 tab under tongue at first sign of chest pain. May repeat every 5 minutes until relief is obtained. If pain persists after taking 3 tabs in a 15-minute period, or the pain is different than is typically experienced, call 9-1-1 immediately.  glucose blood VI test strips (ASCENSIA AUTODISC VI;ONE TOUCH ULTRA TEST VI) strip 1 each by In Vitro route daily Indications: Type 2 Diabetes Test once daily 100 each 2    BRILINTA 90 MG TABS tablet Take 1 tablet by mouth 2 times daily (Patient not taking: Reported on 8/6/2020) 180 tablet 2    aspirin 81 MG tablet Take 81 mg by mouth daily Indications: Disease of the Arteries of the Heart       No current facility-administered medications for this visit. Allergies   Allergen Reactions    Lisinopril      Other reaction(s):  Intolerance-unknown       Health Maintenance   Topic Date Due    Shingles Vaccine (1 of 2) 04/15/1995    Diabetic foot exam  01/19/2018    DTaP/Tdap/Td vaccine (2 - Td) 08/28/2018    Diabetic retinal exam  05/09/2020    Flu vaccine (1) 09/01/2020    Lipid screen  09/12/2020    Annual Wellness Visit (AWV)  10/18/2020    A1C test (Diabetic or Prediabetic)  02/19/2021    Colon cancer screen colonoscopy  03/10/2024    Pneumococcal 65+ years Vaccine  Completed    Hepatitis C screen  Completed    Hepatitis A vaccine  Aged Out    Hib vaccine  Aged Out    Meningococcal (ACWY) vaccine  Aged Out       Subjective:      Review of Systems   Constitutional: Negative for activity change, appetite change, fatigue and fever. HENT: Negative for congestion and sore throat. Eyes: Negative for pain, discharge and visual disturbance. Respiratory: Negative for cough, chest tightness and shortness of breath. Cardiovascular: Negative for chest pain and palpitations. Gastrointestinal: Negative for nausea and vomiting. Endocrine: Negative for polydipsia and polyphagia. Genitourinary: Negative for dysuria. Neurological: Negative for dizziness and numbness. Psychiatric/Behavioral: Negative for sleep disturbance. Objective:     /66   Pulse 64   Temp 97.7 °F (36.5 °C)   Wt 208 lb 3.2 oz (94.4 kg)   SpO2 98%   BMI 31.66 kg/m²   Physical Exam  Vitals signs and nursing note reviewed. Constitutional:       General: He is not in acute distress. Appearance: He is well-developed. HENT:      Head: Normocephalic and atraumatic. Right Ear: External ear normal.      Left Ear: External ear normal.   Eyes:      General:         Right eye: No discharge. Left eye: No discharge. Conjunctiva/sclera: Conjunctivae normal.      Pupils: Pupils are equal, round, and reactive to light. Neck:      Thyroid: No thyromegaly. Trachea: No tracheal deviation. Cardiovascular:      Rate and Rhythm: Normal rate and regular rhythm. Heart sounds: Normal heart sounds. Comments: No carotid bruits  Pulmonary:      Effort: Pulmonary effort is normal. No respiratory distress. Breath sounds: Normal breath sounds. No wheezing. Lymphadenopathy:      Cervical: No cervical adenopathy. Skin:     General: Skin is warm and dry. Findings: No rash.    Neurological:      Mental Status: He is alert and oriented to person, place, and time. Comments: Diabetic foot check: Bunions: bilateral . Hammertoes none. Plantar calluses none. No cyanosis or clubbing. sensation intact on 6 of 6 test points with the 10 gram filament. Dorsalis pedis pulses intact bilaterally. Capillary refill at the toes was less than 2 seconds. No skin breakdown, erythema, blisters, scaling, or ulcers. No evidence of fungal infection. Psychiatric:         Behavior: Behavior normal.         Thought Content: Thought content normal.         Assessment:       Diagnosis Orders   1. Type 2 diabetes mellitus with other circulatory complication, unspecified whether long term insulin use (HCC)  POCT glycosylated hemoglobin (Hb A1C)   2. Essential hypertension     3. Elevated prostate specific antigen (PSA)     4. Coronary artery disease involving native heart without angina pectoris, unspecified vessel or lesion type          Plan:      Return in about 3 months (around 11/6/2020). Orders Placed This Encounter   Procedures    POCT glycosylated hemoglobin (Hb A1C)     Orders Placed This Encounter   Medications    Continuous Blood Gluc Sensor (FREESTYLE AMINAH 14 DAY SENSOR) MISC     Sig: Use to check sugars due to hyperglycemia and uncontrolled DM     Dispense:  2 each     Refill:  3    Continuous Blood Gluc  (FREESTYLE AMINAH 14 DAY READER) LAURA     Sig: Use to check BS qid prn due to hyperglycemia     Dispense:  1 Device     Refill:  3       Patient given educationalmaterials - see patient instructions. Discussed use, benefit, and side effectsof prescribed medications. All patient questions answered. Pt voiced understanding. Reviewed health maintenance. Instructed to continue current medications, diet andexercise. Patient agreed with treatment plan. Follow up as directed.      Electronicallysigned by Remigio Alegria MD on 8/6/2020 at 2:40 PM

## 2020-10-26 ENCOUNTER — TELEPHONE (OUTPATIENT)
Dept: PRIMARY CARE CLINIC | Age: 75
End: 2020-10-26

## 2020-10-27 ENCOUNTER — TELEPHONE (OUTPATIENT)
Dept: PRIMARY CARE CLINIC | Age: 75
End: 2020-10-27

## 2020-10-27 NOTE — TELEPHONE ENCOUNTER
[facility] Initial Follow Up Call    Outreach made within 2 business days of discharge: Yes    Patient: Yassine Ochoa Patient :    MRN: [id number]  Reason for Admission: No discharge information exists for this patient.   Discharge Date:         Spoke with: No answer, left a VM to call the office back     [facility] department[facility]facility: [facility]    Scheduled appointment with PCP within 7-14 days    Follow Up  Future Appointments   Date Time Provider Justice Chow   2020  9:45 AM Christian Sinclair MD [facility] [place], [place]

## 2020-11-06 ENCOUNTER — NURSE ONLY (OUTPATIENT)
Dept: PRIMARY CARE CLINIC | Age: 75
End: 2020-11-06

## 2020-11-06 ENCOUNTER — TELEPHONE (OUTPATIENT)
Dept: PRIMARY CARE CLINIC | Age: 75
End: 2020-11-06

## 2020-11-06 VITALS — OXYGEN SATURATION: 96 % | HEART RATE: 86 BPM | TEMPERATURE: 98.5 F

## 2020-11-06 LAB
BILIRUBIN, POC: ABNORMAL
BLOOD URINE, POC: ABNORMAL
CLARITY, POC: CLEAR
COLOR, POC: YELLOW
GLUCOSE URINE, POC: ABNORMAL
KETONES, POC: ABNORMAL
LEUKOCYTE EST, POC: ABNORMAL
NITRITE, POC: ABNORMAL
PH, POC: 5
PROTEIN, POC: 100
SPECIFIC GRAVITY, POC: 1.02
UROBILINOGEN, POC: 0.2

## 2020-11-06 NOTE — PROGRESS NOTES
Urinary Tract Infection: Patient complains of dysuria He has had symptoms for 3 days. Patient also complains of headache and tiredness. Patient denies Don't deny Sx. Patient does not have a history of recurrent UTI. Patient does not have a history of pyelonephritis. Dr. Sandee Rojas would like urinalysis and culture done.

## 2020-11-10 ENCOUNTER — OFFICE VISIT (OUTPATIENT)
Dept: PRIMARY CARE CLINIC | Age: 75
End: 2020-11-10
Payer: COMMERCIAL

## 2020-11-10 ENCOUNTER — TELEPHONE (OUTPATIENT)
Dept: PRIMARY CARE CLINIC | Age: 75
End: 2020-11-10

## 2020-11-10 VITALS
SYSTOLIC BLOOD PRESSURE: 122 MMHG | DIASTOLIC BLOOD PRESSURE: 70 MMHG | HEART RATE: 69 BPM | WEIGHT: 193 LBS | OXYGEN SATURATION: 93 % | TEMPERATURE: 96.8 F | BODY MASS INDEX: 29.35 KG/M2

## 2020-11-10 PROBLEM — Z95.5 PRESENCE OF CORONARY ANGIOPLASTY IMPLANT AND GRAFT: Status: ACTIVE | Noted: 2018-01-08

## 2020-11-10 PROBLEM — R53.0 NEOPLASTIC (MALIGNANT) RELATED FATIGUE: Status: ACTIVE | Noted: 2018-01-08

## 2020-11-10 PROBLEM — C61 ADENOCARCINOMA OF PROSTATE (HCC): Status: ACTIVE | Noted: 2020-07-13

## 2020-11-10 PROBLEM — N39.0 UTI (URINARY TRACT INFECTION): Status: ACTIVE | Noted: 2020-10-22

## 2020-11-10 LAB
BILIRUBIN, POC: NORMAL
BLOOD URINE, POC: NORMAL
CLARITY, POC: NORMAL
COLOR, POC: YELLOW
GLUCOSE URINE, POC: NORMAL
HBA1C MFR BLD: 7.9 %
KETONES, POC: NORMAL
LEUKOCYTE EST, POC: NORMAL
NITRITE, POC: NORMAL
PH, POC: 5
PROTEIN, POC: NORMAL
SPECIFIC GRAVITY, POC: 1.02
UROBILINOGEN, POC: NORMAL

## 2020-11-10 PROCEDURE — 83036 HEMOGLOBIN GLYCOSYLATED A1C: CPT | Performed by: NURSE PRACTITIONER

## 2020-11-10 PROCEDURE — 3051F HG A1C>EQUAL 7.0%<8.0%: CPT | Performed by: NURSE PRACTITIONER

## 2020-11-10 PROCEDURE — 99214 OFFICE O/P EST MOD 30 MIN: CPT | Performed by: NURSE PRACTITIONER

## 2020-11-10 RX ORDER — CEPHALEXIN 500 MG/1
500 CAPSULE ORAL 3 TIMES DAILY
COMMUNITY
Start: 2020-11-08 | End: 2020-11-10

## 2020-11-10 RX ORDER — SULFAMETHOXAZOLE AND TRIMETHOPRIM 800; 160 MG/1; MG/1
1 TABLET ORAL 2 TIMES DAILY
Qty: 14 TABLET | Refills: 0 | Status: SHIPPED | OUTPATIENT
Start: 2020-11-10 | End: 2020-01-01

## 2020-11-10 ASSESSMENT — ENCOUNTER SYMPTOMS
SHORTNESS OF BREATH: 0
NAUSEA: 0
BACK PAIN: 0
WHEEZING: 0
VOMITING: 0
COUGH: 0
DIARRHEA: 1

## 2020-11-10 NOTE — TELEPHONE ENCOUNTER
Per Gabriella Jacob, patient needs to get an appointment soon with Dr Edwin Jo. I called and left a message asking them to call back so we can get patient scheduled. 36 Spoke with Dr Aster thomas about getting an appointment sooner than 12/16/2020. I was able to schedule him at 11/30 at 115 pm, that is the soonest they could do. The patient was seen on 10/27. They weren't sure what else they can do for him? I explained that he went to the ER on 11/8 for UTI and he is being treated, wasn't feeling better, came to our office today. ABX was switched and urine culture resent. He seems confused about who he is supposed to follow up with and why this is happening? 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 The patient will also be following up with Dr. Lisandra Juan in the future, he is the physician who completed the procedure.

## 2020-11-10 NOTE — TELEPHONE ENCOUNTER
Spoke to patient's daughter with patient's permission. I updated her on POC, date of new urology appointment and ABX switch. Suggested that someone go to appointments with him this way there is no miscommunication and that patient feels more comfortable and confident. She was grateful for the telephone call. She did not have any further questions or concerns at this time.

## 2020-11-10 NOTE — PATIENT INSTRUCTIONS
Patient Education        Urinary Tract Infections in Men: Care Instructions  Your Care Instructions     A urinary tract infection, or UTI, is a general term for an infection anywhere between the kidneys and the tip of the penis. UTIs can also be a result of a prostate problem. Most cause pain or burning when you urinate. Most UTIs are caused by bacteria and can be cured with antibiotics. It is important to complete your treatment so that the infection does not get worse. Follow-up care is a key part of your treatment and safety. Be sure to make and go to all appointments, and call your doctor if you are having problems. It's also a good idea to know your test results and keep a list of the medicines you take. How can you care for yourself at home? · Take your antibiotics as prescribed. Do not stop taking them just because you feel better. You need to take the full course of antibiotics. · Take your medicines exactly as prescribed. Your doctor may have prescribed a medicine, such as phenazopyridine (Pyridium), to help relieve pain when you urinate. This turns your urine orange. You may stop taking it when your symptoms get better. But be sure to take all of your antibiotics, which treat the infection. · Drink extra water for the next day or two. This will help make the urine less concentrated and help wash out the bacteria causing the infection. (If you have kidney, heart, or liver disease and have to limit your fluids, talk with your doctor before you increase your fluid intake.)  · Avoid drinks that are carbonated or have caffeine. They can irritate the bladder. · Urinate often. Try to empty your bladder each time. · To relieve pain, take a hot bath or lay a heating pad (set on low) over your lower belly or genital area. Never go to sleep with a heating pad in place. To help prevent UTIs  · Drink plenty of fluids, enough so that your urine is light yellow or clear like water.  If you have kidney, heart, or liver disease and have to limit fluids, talk with your doctor before you increase the amount of fluids you drink. · Urinate when you have the urge. Do not hold your urine for a long time. Urinate before you go to sleep. · Keep your penis clean. Catheter care  If you have a drainage tube (catheter) in place, the following steps will help you care for it. · Always wash your hands before and after touching your catheter. · Check the area around the urethra for inflammation or signs of infection. Signs of infection include irritated, swollen, red, or tender skin, or pus around the catheter. · Clean the area around the catheter with soap and water two times a day. Dry with a clean towel afterward. · Do not apply powder or lotion to the skin around the catheter. To empty the urine collection bag   · Wash your hands with soap and water. · Without touching the drain spout, remove the spout from its sleeve at the bottom of the collection bag. Open the valve on the spout. · Let the urine flow out of the bag and into the toilet or a container. Do not let the tubing or drain spout touch anything. · After you empty the bag, clean the end of the drain spout with tissue and water. Close the valve and put the drain spout back into its sleeve at the bottom of the collection bag. · Wash your hands with soap and water. When should you call for help? Call your doctor now or seek immediate medical care if:    · Symptoms such as a fever, chills, nausea, or vomiting get worse or happen for the first time.     · You have new pain in your back just below your rib cage. This is called flank pain.     · There is new blood or pus in your urine.     · You are not able to take or keep down your antibiotics. Watch closely for changes in your health, and be sure to contact your doctor if:    · You are not getting better after taking an antibiotic for 2 days.     · Your symptoms go away but then come back.    Where can you learn more?  Go to [email or web address] and sign in to your itzbig account. Enter N542 in the [facility] box to learn more about \"Urinary Tract Infections in Men: Care Instructions. \"     If you do not have an account, please click on the \"Sign Up Now\" link. Current as of: June 29, 2020               Content Version: 12.6  © 2006-2020 [employer]. Care instructions adapted under license by [facility] ([facility]). If you have questions about a medical condition or this instruction, always ask your healthcare professional. [address] any warranty or liability for your use of this information.

## 2020-11-10 NOTE — PROGRESS NOTES
Schizophrenia Brother        Social History     Tobacco Use    Smoking status: Never Smoker    Smokeless tobacco: Never Used   Substance Use Topics    Alcohol use: Yes     Alcohol/week: 0.0 standard drinks     Frequency: Monthly or less     Comment: occ      Current Outpatient Medications   Medication Sig Dispense Refill    sulfamethoxazole-trimethoprim (BACTRIM DS;SEPTRA DS) 800-160 MG per tablet Take 1 tablet by mouth 2 times daily for 7 days 14 tablet 0    Continuous Blood Gluc Sensor (FREESTYLE AMINAH 14 DAY SENSOR) MISC Use to check sugars due to hyperglycemia and uncontrolled DM 2 each 3    Continuous Blood Gluc  (FREESTYLE AMINAH 14 DAY READER) LAURA Use to check BS qid prn due to hyperglycemia 1 Device 3    metFORMIN (GLUCOPHAGE) 1000 MG tablet TAKE 1 TABLET BY MOUTH TWO TIMES A DAY WITH MEALS 60 tablet 3    glipiZIDE (GLUCOTROL) 5 MG tablet Take 1 tablet by mouth 2 times daily (before meals) 180 tablet 2    atorvastatin (LIPITOR) 80 MG tablet Take 1 tablet by mouth daily 30 tablet 5    metoprolol tartrate (LOPRESSOR) 25 MG tablet Take 1 tablet by mouth 2 times daily 60 tablet 3    nitroGLYCERIN (NITROSTAT) 0.4 MG SL tablet Place 0.4 mg under the tongue every 5 minutes as needed for Chest pain Dissolve 1 tab under tongue at first sign of chest pain. May repeat every 5 minutes until relief is obtained. If pain persists after taking 3 tabs in a 15-minute period, or the pain is different than is typically experienced, call 9-1-1 immediately.  glucose blood VI test strips (ASCENSIA AUTODISC VI;ONE TOUCH ULTRA TEST VI) strip 1 each by In Vitro route daily Indications: Type 2 Diabetes Test once daily 100 each 2     No current facility-administered medications for this visit. Allergies   Allergen Reactions    Lisinopril      Other reaction(s):  Intolerance-unknown       Health Maintenance   Topic Date Due    Shingles Vaccine (1 of 2) 04/15/1995    Diabetic foot exam  01/19/2018    DTaP/Tdap/Td vaccine (2 - Td) 08/28/2018    Annual Wellness Visit (AWV)  05/29/2019    PSA counseling  04/11/2020    Diabetic retinal exam  05/09/2020    Lipid screen  09/12/2020    Potassium monitoring  09/12/2020    Creatinine monitoring  09/12/2020    Flu vaccine (1) 11/10/2021 (Originally 9/1/2020)    A1C test (Diabetic or Prediabetic)  08/06/2021    Colon cancer screen colonoscopy  03/10/2024    Pneumococcal 65+ years Vaccine  Completed    Hepatitis C screen  Completed    Hepatitis A vaccine  Aged Out    Hib vaccine  Aged Out    Meningococcal (ACWY) vaccine  Aged Out       Subjective:      Review of Systems   Constitutional: Positive for appetite change (only wants breakfast) and fatigue. Negative for chills and fever. Respiratory: Negative for cough, shortness of breath and wheezing. Cardiovascular: Negative for chest pain and palpitations. Gastrointestinal: Positive for diarrhea (from antibiotics). Negative for nausea and vomiting. Endocrine: Negative for polydipsia, polyphagia and polyuria. Genitourinary: Negative for difficulty urinating, dysuria, frequency, hematuria and urgency. Musculoskeletal: Negative for back pain and myalgias. Neurological: Positive for dizziness (if he gets up quickly). Negative for light-headedness and headaches. Objective:     /70   Pulse 69   Temp 96.8 °F (36 °C)   Wt 193 lb (87.5 kg)   SpO2 93%   BMI 29.35 kg/m²   Physical Exam  Vitals signs and nursing note reviewed. HENT:      Head: Normocephalic and atraumatic. Eyes:      Extraocular Movements: Extraocular movements intact. Conjunctiva/sclera: Conjunctivae normal.      Pupils: Pupils are equal, round, and reactive to light. Neck:      Musculoskeletal: Normal range of motion and neck supple. Cardiovascular:      Rate and Rhythm: Normal rate and regular rhythm. Heart sounds: Normal heart sounds.    Pulmonary:      Effort: Pulmonary effort is normal.      Breath sounds: Normal breath sounds. Abdominal:      Tenderness: There is no right CVA tenderness or left CVA tenderness. Skin:     General: Skin is warm and dry. Neurological:      General: No focal deficit present. Mental Status: He is alert and oriented to person, place, and time. Mental status is at baseline. Psychiatric:         Mood and Affect: Mood normal.         Behavior: Behavior normal.         Thought Content: Thought content normal.         Judgment: Judgment normal.       Assessment:       Diagnosis Orders   1. Acute cystitis with hematuria  sulfamethoxazole-trimethoprim (BACTRIM DS;SEPTRA DS) 800-160 MG per tablet   2. Fatigue, unspecified type  POCT Urinalysis no Micro    Culture, Urine   3. Type 2 diabetes mellitus with other circulatory complication, unspecified whether long term insulin use (HCC)  POCT glycosylated hemoglobin (Hb A1C)      1. STOP Keflex and replaced with Bactrim  2. Drink plenty of water. 3. Check blood sugar at home. 4. Take prescriptions as written and keep appointment with Dr. Willy Lemus. 5. A1C checked today. 6. Will call when urine culture is finalized. 7. Will call daughter with patient's permission to update her on POC. 8. MA will call Dr. No Roll office to schedule an appointment. 9. Call 911 if you develop chest pains, difficulty breathing, lethargy, or sudden severe headache. 1Return if symptoms worsen or fail to improve. Orders Placed This Encounter   Procedures    Culture, Urine     Standing Status:   Future     Standing Expiration Date:   11/10/2021     Order Specific Question:   Specify (ex-cath, midstream, cysto, etc)?      Answer:   midstream    POCT Urinalysis no Micro    POCT glycosylated hemoglobin (Hb A1C)     Orders Placed This Encounter   Medications    sulfamethoxazole-trimethoprim (BACTRIM DS;SEPTRA DS) 800-160 MG per tablet     Sig: Take 1 tablet by mouth 2 times daily for 7 days     Dispense:  14 tablet     Refill:  0 Patient given educationalmaterials - see patient instructions. Discussed use, benefit, and side effectsof prescribed medications. All patient questions answered. Pt voiced understanding. Reviewed health maintenance. Instructed to continue current medications, diet andexercise. Patient agreed with treatment plan. Follow up as directed.      Electronicallysigned by ENZO Sarah CNP on 11/10/2020 at 12:00 PM

## 2020-11-13 ENCOUNTER — TELEPHONE (OUTPATIENT)
Dept: PRIMARY CARE CLINIC | Age: 75
End: 2020-11-13

## 2020-11-13 NOTE — TELEPHONE ENCOUNTER
----- Message from Tiajuana Cushing, APRN - CNP sent at 11/10/2020  1:22 PM EST -----  Regarding: appt with urologist  Can we please inform the patient that his urology appt (Dr. Jemima Smith) has been moved from 12/16 to 11/30 at 115pm. His daughter jordan is also aware and will be joining him during that visit. Thank you.

## 2020-11-19 NOTE — TELEPHONE ENCOUNTER
No need to retest urine as the fatigue and all his other symptoms are most likely due to the Covid infection and not the UTI. Fatigue is one of the most common symptoms. His urine culture from the 10th showed that the infection was pretty much gone, so as long as he finished the antibiotics then the UTI should be fine.

## 2020-11-24 NOTE — PROGRESS NOTES
Patient here today for dysuria. Patient states sx's started yesterday. He is on day 14 of COVID +; stated he is feeling better but did not have a lot of energy while he was sick. He does not think he has been cleaning his genital area well enough during showers/baths. Consulted with Dr. Sujatha Fisher - urine culture sent in. Dr. Sujatha Fisher ordered abx with Pyridium. Patient notified and will .     Patient notified to follow up if sx's persist.

## 2020-11-26 NOTE — TELEPHONE ENCOUNTER
Daughter Brian paged because patient continued to have constipation and abdominal pain. However, just a few minutes ago patient was able to have a large bowel movement. It seems like bowels are now moving and relief is occurring. If abdominal pain returns or trouble with bowels she will call back.

## 2020-12-10 PROBLEM — N39.0 UTI (URINARY TRACT INFECTION): Status: RESOLVED | Noted: 2020-10-22 | Resolved: 2020-01-01

## 2021-01-01 ENCOUNTER — TELEPHONE (OUTPATIENT)
Dept: PRIMARY CARE CLINIC | Age: 76
End: 2021-01-01

## 2021-01-01 ENCOUNTER — OFFICE VISIT (OUTPATIENT)
Dept: PRIMARY CARE CLINIC | Age: 76
End: 2021-01-01
Payer: COMMERCIAL

## 2021-01-01 VITALS
OXYGEN SATURATION: 98 % | SYSTOLIC BLOOD PRESSURE: 118 MMHG | DIASTOLIC BLOOD PRESSURE: 55 MMHG | HEIGHT: 68 IN | WEIGHT: 209 LBS | HEART RATE: 51 BPM | BODY MASS INDEX: 31.67 KG/M2

## 2021-01-01 VITALS
WEIGHT: 211 LBS | SYSTOLIC BLOOD PRESSURE: 140 MMHG | BODY MASS INDEX: 32.08 KG/M2 | HEART RATE: 51 BPM | OXYGEN SATURATION: 98 % | DIASTOLIC BLOOD PRESSURE: 62 MMHG

## 2021-01-01 DIAGNOSIS — N17.9 ACUTE RENAL FAILURE SUPERIMPOSED ON STAGE 3B CHRONIC KIDNEY DISEASE, UNSPECIFIED ACUTE RENAL FAILURE TYPE (HCC): ICD-10-CM

## 2021-01-01 DIAGNOSIS — N18.32 CHRONIC RENAL FAILURE, STAGE 3B (HCC): ICD-10-CM

## 2021-01-01 DIAGNOSIS — S81.801A LEG WOUND, RIGHT, INITIAL ENCOUNTER: ICD-10-CM

## 2021-01-01 DIAGNOSIS — N18.32 ACUTE RENAL FAILURE SUPERIMPOSED ON STAGE 3B CHRONIC KIDNEY DISEASE, UNSPECIFIED ACUTE RENAL FAILURE TYPE (HCC): ICD-10-CM

## 2021-01-01 DIAGNOSIS — I25.10 ATHEROSCLEROSIS OF NATIVE CORONARY ARTERY OF NATIVE HEART WITHOUT ANGINA PECTORIS: Primary | ICD-10-CM

## 2021-01-01 DIAGNOSIS — I10 ESSENTIAL (PRIMARY) HYPERTENSION: Primary | ICD-10-CM

## 2021-01-01 DIAGNOSIS — I10 ESSENTIAL (PRIMARY) HYPERTENSION: ICD-10-CM

## 2021-01-01 DIAGNOSIS — E11.59 TYPE 2 DIABETES MELLITUS WITH OTHER CIRCULATORY COMPLICATION, UNSPECIFIED WHETHER LONG TERM INSULIN USE (HCC): ICD-10-CM

## 2021-01-01 LAB
BUN BLDV-MCNC: NORMAL MG/DL
CALCIUM SERPL-MCNC: NORMAL MG/DL
CHLORIDE BLD-SCNC: NORMAL MMOL/L
CO2: NORMAL
CREAT SERPL-MCNC: NORMAL MG/DL
GFR CALCULATED: NORMAL
GLUCOSE BLD-MCNC: NORMAL MG/DL
HBA1C MFR BLD: 8.2 %
POTASSIUM SERPL-SCNC: NORMAL MMOL/L
SODIUM BLD-SCNC: NORMAL MMOL/L

## 2021-01-01 PROCEDURE — 83036 HEMOGLOBIN GLYCOSYLATED A1C: CPT | Performed by: FAMILY MEDICINE

## 2021-01-01 PROCEDURE — G0008 ADMIN INFLUENZA VIRUS VAC: HCPCS | Performed by: FAMILY MEDICINE

## 2021-01-01 PROCEDURE — 99214 OFFICE O/P EST MOD 30 MIN: CPT | Performed by: FAMILY MEDICINE

## 2021-01-01 PROCEDURE — 90694 VACC AIIV4 NO PRSRV 0.5ML IM: CPT | Performed by: FAMILY MEDICINE

## 2021-01-01 PROCEDURE — 1111F DSCHRG MED/CURRENT MED MERGE: CPT | Performed by: FAMILY MEDICINE

## 2021-01-01 PROCEDURE — 99213 OFFICE O/P EST LOW 20 MIN: CPT | Performed by: FAMILY MEDICINE

## 2021-01-01 RX ORDER — ISOSORBIDE MONONITRATE 30 MG/1
30 TABLET, EXTENDED RELEASE ORAL DAILY
COMMUNITY
Start: 2021-01-01

## 2021-01-01 RX ORDER — ATORVASTATIN CALCIUM 80 MG/1
80 TABLET, FILM COATED ORAL DAILY
Qty: 30 TABLET | Refills: 3 | Status: SHIPPED | OUTPATIENT
Start: 2021-01-01

## 2021-01-01 RX ORDER — NITROGLYCERIN 0.4 MG/1
TABLET SUBLINGUAL
Qty: 25 TABLET | Refills: 0 | Status: SHIPPED | OUTPATIENT
Start: 2021-01-01

## 2021-01-01 RX ORDER — SPIRONOLACTONE 25 MG/1
25 TABLET ORAL DAILY
COMMUNITY

## 2021-01-01 RX ORDER — ASPIRIN 81 MG/1
81 TABLET ORAL DAILY
COMMUNITY

## 2021-01-01 SDOH — ECONOMIC STABILITY: FOOD INSECURITY: WITHIN THE PAST 12 MONTHS, YOU WORRIED THAT YOUR FOOD WOULD RUN OUT BEFORE YOU GOT MONEY TO BUY MORE.: NEVER TRUE

## 2021-01-01 SDOH — ECONOMIC STABILITY: FOOD INSECURITY: WITHIN THE PAST 12 MONTHS, THE FOOD YOU BOUGHT JUST DIDN'T LAST AND YOU DIDN'T HAVE MONEY TO GET MORE.: NEVER TRUE

## 2021-01-01 ASSESSMENT — ENCOUNTER SYMPTOMS
ABDOMINAL PAIN: 0
RHINORRHEA: 0
SHORTNESS OF BREATH: 0
COUGH: 0
NAUSEA: 0
VOMITING: 0
EYE REDNESS: 0
EYE DISCHARGE: 0
SORE THROAT: 0
WHEEZING: 0
DIARRHEA: 0

## 2021-01-01 ASSESSMENT — SOCIAL DETERMINANTS OF HEALTH (SDOH): HOW HARD IS IT FOR YOU TO PAY FOR THE VERY BASICS LIKE FOOD, HOUSING, MEDICAL CARE, AND HEATING?: NOT HARD AT ALL

## 2021-01-01 ASSESSMENT — PATIENT HEALTH QUESTIONNAIRE - PHQ9
SUM OF ALL RESPONSES TO PHQ QUESTIONS 1-9: 0
SUM OF ALL RESPONSES TO PHQ9 QUESTIONS 1 & 2: 0
1. LITTLE INTEREST OR PLEASURE IN DOING THINGS: 0
SUM OF ALL RESPONSES TO PHQ QUESTIONS 1-9: 0
SUM OF ALL RESPONSES TO PHQ QUESTIONS 1-9: 0
2. FEELING DOWN, DEPRESSED OR HOPELESS: 0

## 2021-07-13 NOTE — TELEPHONE ENCOUNTER
Pt had a heart cath today at [facility] and pt's creatinine level was abnormal. Daughter Catherine Kelly (on HIPAA) is asking if he needs repeat labs and/or an office visit.

## 2021-07-16 NOTE — PROGRESS NOTES
Health Maintenance Summary     Topic Due On Due Status Completed On Postpone Until Reason    Immunization - Td/Tdap Mar 4, 2025 Not Due Mar 4, 2015      Immunization-Influenza Sep 1, 2016 Postponed  Apr 1, 2017 Patient Refused          Patient is up to date, no discussion needed . MISC  Use to check sugars due to hyperglycemia and uncontrolled DM             glipiZIDE (GLUCOTROL) 5 MG tablet  TAKE 1 TABLET BY MOUTH TWO TIMES A DAY BEFORE MEALS             glucose blood VI test strips (ASCENSIA AUTODISC VI;ONE TOUCH ULTRA TEST VI) strip  1 each by In Vitro route daily Indications: Type 2 Diabetes Test once daily             isosorbide mononitrate (IMDUR) 30 MG extended release tablet  Take 30 mg by mouth daily             metFORMIN (GLUCOPHAGE) 1000 MG tablet  TAKE 1 TABLET BY MOUTH TWO TIMES A DAY WITH MEALS             metoprolol tartrate (LOPRESSOR) 25 MG tablet  Take 1 tablet by mouth 2 times daily             nitroGLYCERIN (NITROSTAT) 0.4 MG SL tablet  Use under the tongue as needed every 5 mins x3. If not effective then call 911.             spironolactone (ALDACTONE) 25 MG tablet  Take 25 mg by mouth daily                   Medications marked \"taking\" at this time  Outpatient Medications Marked as Taking for the 7/16/21 encounter (Office Visit) with Casi Berman MD   Medication Sig Dispense Refill    aspirin 81 MG EC tablet Take 81 mg by mouth daily      spironolactone (ALDACTONE) 25 MG tablet Take 25 mg by mouth daily      isosorbide mononitrate (IMDUR) 30 MG extended release tablet Take 30 mg by mouth daily      nitroGLYCERIN (NITROSTAT) 0.4 MG SL tablet Use under the tongue as needed every 5 mins x3.   If not effective then call 911. 25 tablet 0    atorvastatin (LIPITOR) 80 MG tablet Take 1 tablet by mouth daily 30 tablet 3    glipiZIDE (GLUCOTROL) 5 MG tablet TAKE 1 TABLET BY MOUTH TWO TIMES A DAY BEFORE MEALS 180 tablet 3    metFORMIN (GLUCOPHAGE) 1000 MG tablet TAKE 1 TABLET BY MOUTH TWO TIMES A DAY WITH MEALS 180 tablet 3    Continuous Blood Gluc Sensor (FREESTYLE AMINAH 14 DAY SENSOR) MISC Use to check sugars due to hyperglycemia and uncontrolled DM 2 each 3    Continuous Blood Gluc  (FREESTYLE AMINAH 14 DAY READER) LAURA Use to check BS qid prn due to Neck:      Thyroid: No thyromegaly. Trachea: No tracheal deviation. Cardiovascular:      Rate and Rhythm: Normal rate and regular rhythm. Heart sounds: Normal heart sounds. Pulmonary:      Effort: Pulmonary effort is normal. No respiratory distress. Breath sounds: Normal breath sounds. No wheezing. Lymphadenopathy:      Cervical: No cervical adenopathy. Skin:     General: Skin is warm. Findings: No rash. Neurological:      Mental Status: He is alert and oriented to person, place, and time. Psychiatric:         Mood and Affect: Mood normal.         Behavior: Behavior normal.         Thought Content: Thought content normal.         Assessment/Plan:  1. Atherosclerosis of native coronary artery of native heart without angina pectoris  S/p cath    2. Type 2 diabetes mellitus with other circulatory complication, unspecified whether long term insulin use (HCC)  Hold metformin   - POCT glycosylated hemoglobin (Hb A1C)    3. Acute renal failure superimposed on stage 3b chronic kidney disease, unspecified acute renal failure type ([facility]  -Stop aldactone and metformin and get labs done in about 2 weeks. Depending on results may restart metformin    - Basic Metabolic Panel;  Future        Medical Decision Making: moderate complexity

## 2021-09-24 PROBLEM — S81.801A LEG WOUND, RIGHT, INITIAL ENCOUNTER: Status: ACTIVE | Noted: 2021-01-01
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
## 2021-11-03 NOTE — TELEPHONE ENCOUNTER
Patient asking if he should be taking metformin - he states he was taken off of it but is unsure if he should have gotten back on it. Please advise.

## 2021-11-19 ENCOUNTER — TELEPHONE (OUTPATIENT)
Dept: PRIMARY CARE CLINIC | Age: 76
End: 2021-11-19